# Patient Record
Sex: FEMALE | Race: WHITE | URBAN - METROPOLITAN AREA
[De-identification: names, ages, dates, MRNs, and addresses within clinical notes are randomized per-mention and may not be internally consistent; named-entity substitution may affect disease eponyms.]

---

## 2022-06-15 ENCOUNTER — OFFICE VISIT (OUTPATIENT)
Dept: URBAN - METROPOLITAN AREA CLINIC 30 | Facility: CLINIC | Age: 52
End: 2022-06-15
Payer: COMMERCIAL

## 2022-06-15 DIAGNOSIS — Q04.6 CONGENITAL CEREBRAL CYSTS: Primary | ICD-10-CM

## 2022-06-15 DIAGNOSIS — H35.411 LATTICE DEGENERATION OF RETINA, RIGHT EYE: ICD-10-CM

## 2022-06-15 DIAGNOSIS — Z98.890 OTHER SPECIFIED POSTPROCEDURAL STATE: ICD-10-CM

## 2022-06-15 PROCEDURE — 92250 FUNDUS PHOTOGRAPHY W/I&R: CPT | Performed by: STUDENT IN AN ORGANIZED HEALTH CARE EDUCATION/TRAINING PROGRAM

## 2022-06-15 PROCEDURE — 92134 CPTRZ OPH DX IMG PST SGM RTA: CPT | Performed by: STUDENT IN AN ORGANIZED HEALTH CARE EDUCATION/TRAINING PROGRAM

## 2022-06-15 PROCEDURE — 92004 COMPRE OPH EXAM NEW PT 1/>: CPT | Performed by: STUDENT IN AN ORGANIZED HEALTH CARE EDUCATION/TRAINING PROGRAM

## 2022-06-15 ASSESSMENT — VISUAL ACUITY
OS: 20/40
OD: 20/40

## 2022-06-15 ASSESSMENT — INTRAOCULAR PRESSURE
OS: 9
OD: 11

## 2022-06-15 ASSESSMENT — KERATOMETRY
OS: 39.57
OD: 41.53

## 2022-06-15 NOTE — IMPRESSION/PLAN
Impression: Lattice degeneration of retina, right eye: H35.411. Plan: Pt ed findings, reassured pt no retinal breaks or detachment. Pt ed symptoms of RD/RT and to seek care immediately if noted. Monitor.

## 2022-06-15 NOTE — IMPRESSION/PLAN
Impression: Other specified postprocedural state: Z98.890.
-- S/P RK OU Plan: Pt ed contributes to fluctuating vision, recommend cont care with outside ECP for updated CLRx. Ed to increase art tears qid prn OU to help with mild flucutations from dryness throughout the day.

## 2022-06-15 NOTE — IMPRESSION/PLAN
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
-- per pt regrowth to 8mm at last MRI 4/2022
-- next MRI 7/27/2022, to f/u with new neurosurgeon 7/2022
-- (+)HA with changes in positioning Plan: Pt ed findings today, no obvious nerve edema or hyperemia. Photodocumented OU today. RTC for VF & OCT RNFL. Keep f/u appt with neuro providers

## 2022-07-13 ENCOUNTER — OFFICE VISIT (OUTPATIENT)
Dept: URBAN - METROPOLITAN AREA CLINIC 30 | Facility: CLINIC | Age: 52
End: 2022-07-13
Payer: COMMERCIAL

## 2022-07-13 DIAGNOSIS — Q04.6 CONGENITAL CEREBRAL CYSTS: Primary | ICD-10-CM

## 2022-07-13 DIAGNOSIS — Z98.890 OTHER SPECIFIED POSTPROCEDURAL STATE: ICD-10-CM

## 2022-07-13 PROCEDURE — 99213 OFFICE O/P EST LOW 20 MIN: CPT | Performed by: STUDENT IN AN ORGANIZED HEALTH CARE EDUCATION/TRAINING PROGRAM

## 2022-07-13 PROCEDURE — 92083 EXTENDED VISUAL FIELD XM: CPT | Performed by: STUDENT IN AN ORGANIZED HEALTH CARE EDUCATION/TRAINING PROGRAM

## 2022-07-13 ASSESSMENT — INTRAOCULAR PRESSURE
OS: 14
OD: 16

## 2022-07-13 NOTE — IMPRESSION/PLAN
Impression: Other specified postprocedural state: Z98.890.
-- S/P RK OU Plan: Cont art tears qid prn OU to help with mild flucutations from dryness throughout the day.

## 2022-07-13 NOTE — IMPRESSION/PLAN
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
-- per pt regrowth to 8mm at last MRI 4/2022
-- next MRI 7/27/2022, to f/u with new neurosurgeon 7/2022
-- (+)HA with changes in positioning Plan: Pt ed findings today, no obvious nerve edema or hyperemia. HVF 30-2 (7/13/22) OD rel / enlarged BS
  OS OS rel / enlarge BS with paracentral defects SN & ST
OCT RNFL (7/13/22) OD 102um / wnl 360 OS 51um / thin sup & inf but large artifact sup Photodocumented OU 6/2022. 
Cont f/u appts with neuro providers

## 2023-04-25 ENCOUNTER — OFFICE VISIT (OUTPATIENT)
Dept: URBAN - METROPOLITAN AREA CLINIC 30 | Facility: CLINIC | Age: 53
End: 2023-04-25
Payer: COMMERCIAL

## 2023-04-25 DIAGNOSIS — S05.00XA CORNEAL ABRASION W/O FB OF EYE, INITIAL ENCOUNTER: Primary | ICD-10-CM

## 2023-04-25 PROCEDURE — 99213 OFFICE O/P EST LOW 20 MIN: CPT

## 2023-04-25 ASSESSMENT — INTRAOCULAR PRESSURE
OS: 14
OD: 13

## 2023-04-25 NOTE — IMPRESSION/PLAN
Impression: Corneal abrasion w/o FB of eye, initial encounter: S05.00xA. Plan: Pt is a CL wearer, reported irritation and redness so d/c use on Friday, symptoms persisted and went to UC, dx with iritis and began erythromycin susan. No AC inflammation or e/o irits, suspect likely was a small CL related KED. Continue CL holiday x 1 week, erythromycin susan BID. CL hygiene reiterated.

## 2023-05-30 ENCOUNTER — OFFICE VISIT (OUTPATIENT)
Dept: URBAN - METROPOLITAN AREA CLINIC 30 | Facility: CLINIC | Age: 53
End: 2023-05-30
Payer: COMMERCIAL

## 2023-05-30 DIAGNOSIS — Q04.6 CONGENITAL CEREBRAL CYSTS: ICD-10-CM

## 2023-05-30 DIAGNOSIS — S05.00XA CORNEAL ABRASION W/O FB OF EYE, INITIAL ENCOUNTER: Primary | ICD-10-CM

## 2023-05-30 DIAGNOSIS — H35.411 LATTICE DEGENERATION OF RETINA, RIGHT EYE: ICD-10-CM

## 2023-05-30 DIAGNOSIS — H43.393 OTHER VITREOUS OPACITIES, BILATERAL: ICD-10-CM

## 2023-05-30 DIAGNOSIS — Z98.890 OTHER SPECIFIED POSTPROCEDURAL STATE: ICD-10-CM

## 2023-05-30 PROCEDURE — 92134 CPTRZ OPH DX IMG PST SGM RTA: CPT | Performed by: OPTOMETRIST

## 2023-05-30 PROCEDURE — 99213 OFFICE O/P EST LOW 20 MIN: CPT | Performed by: OPTOMETRIST

## 2023-05-30 ASSESSMENT — INTRAOCULAR PRESSURE
OS: 12
OD: 12

## 2023-05-30 ASSESSMENT — VISUAL ACUITY
OD: 20/30
OS: 20/40

## 2023-05-30 ASSESSMENT — KERATOMETRY
OS: 39.28
OD: 40.88

## 2023-05-30 NOTE — IMPRESSION/PLAN
Impression: Lattice degeneration of retina, right eye: H35.411. Plan: Pt ed findings, reassured pt no retinal breaks or detachment. Pt ed symptoms of RD/RT and to seek care immediately if noted. CTM.

## 2023-05-30 NOTE — IMPRESSION/PLAN
Impression: Other specified postprocedural state: Z98.890.
-- S/P RK OU Plan: Cont use of art tears qid prn OU to help with mild flucutations from dryness throughout the day.

## 2023-05-30 NOTE — IMPRESSION/PLAN
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
-- per pt regrowth to 8mm at last MRI 4/2022
--  Neurosurgeon- followed by Tyrell Aldridge MD [facility] 
-- (+)HA with changes in positioning Plan: Stable. No obvious nerve edema or hyperemia. HVF 30-2 (7/13/22) OD rel / enlarged BS
  OS OS rel / enlarge BS with paracentral defects SN & ST
OCT RNFL (7/13/22) OD 102um / wnl 360 OS 51um / thin sup & inf but large artifact sup. Last removal of cyst in 9/2022. Hx of Hydrocephalus. Sees Dr. Tyrell Aldridge at [facility].

## 2025-07-07 ENCOUNTER — OFFICE VISIT (OUTPATIENT)
Dept: URBAN - METROPOLITAN AREA CLINIC 41 | Facility: CLINIC | Age: 55
End: 2025-07-07
Payer: COMMERCIAL

## 2025-07-07 DIAGNOSIS — H52.13 MYOPIA, BILATERAL: ICD-10-CM

## 2025-07-07 DIAGNOSIS — H43.11 VITREOUS HEMORRHAGE, RIGHT EYE: ICD-10-CM

## 2025-07-07 DIAGNOSIS — H43.813 VITREOUS DEGENERATION, BILATERAL: Primary | ICD-10-CM

## 2025-07-07 PROCEDURE — 92134 CPTRZ OPH DX IMG PST SGM RTA: CPT | Performed by: STUDENT IN AN ORGANIZED HEALTH CARE EDUCATION/TRAINING PROGRAM

## 2025-07-07 PROCEDURE — 99204 OFFICE O/P NEW MOD 45 MIN: CPT | Performed by: STUDENT IN AN ORGANIZED HEALTH CARE EDUCATION/TRAINING PROGRAM

## 2025-07-07 ASSESSMENT — INTRAOCULAR PRESSURE
OD: 11
OS: 10